# Patient Record
Sex: MALE | HISPANIC OR LATINO | Employment: FULL TIME | URBAN - METROPOLITAN AREA
[De-identification: names, ages, dates, MRNs, and addresses within clinical notes are randomized per-mention and may not be internally consistent; named-entity substitution may affect disease eponyms.]

---

## 2024-07-29 ENCOUNTER — APPOINTMENT (OUTPATIENT)
Dept: RADIOLOGY | Facility: IMAGING CENTER | Age: 24
End: 2024-07-29
Payer: OTHER MISCELLANEOUS

## 2024-07-29 ENCOUNTER — OCCUPATIONAL MEDICINE (OUTPATIENT)
Dept: URGENT CARE | Facility: CLINIC | Age: 24
End: 2024-07-29
Payer: OTHER MISCELLANEOUS

## 2024-07-29 VITALS
BODY MASS INDEX: 34.36 KG/M2 | TEMPERATURE: 97.6 F | DIASTOLIC BLOOD PRESSURE: 88 MMHG | WEIGHT: 232 LBS | RESPIRATION RATE: 18 BRPM | SYSTOLIC BLOOD PRESSURE: 124 MMHG | HEIGHT: 69 IN | OXYGEN SATURATION: 96 % | HEART RATE: 88 BPM

## 2024-07-29 DIAGNOSIS — M79.662 PAIN IN LEFT LOWER LEG: ICD-10-CM

## 2024-07-29 DIAGNOSIS — S80.12XA CONTUSION OF LEFT LOWER LEG, INITIAL ENCOUNTER: ICD-10-CM

## 2024-07-29 PROCEDURE — 73564 X-RAY EXAM KNEE 4 OR MORE: CPT | Mod: TC,FY,LT

## 2024-08-01 ENCOUNTER — OCCUPATIONAL MEDICINE (OUTPATIENT)
Dept: URGENT CARE | Facility: CLINIC | Age: 24
End: 2024-08-01
Payer: OTHER MISCELLANEOUS

## 2024-08-01 VITALS
RESPIRATION RATE: 18 BRPM | HEIGHT: 69 IN | DIASTOLIC BLOOD PRESSURE: 62 MMHG | HEART RATE: 60 BPM | WEIGHT: 232 LBS | TEMPERATURE: 98.3 F | BODY MASS INDEX: 34.36 KG/M2 | OXYGEN SATURATION: 97 % | SYSTOLIC BLOOD PRESSURE: 120 MMHG

## 2024-08-01 DIAGNOSIS — S80.12XD CONTUSION OF LEFT LOWER EXTREMITY, SUBSEQUENT ENCOUNTER: ICD-10-CM

## 2024-08-01 DIAGNOSIS — T14.8XXA HEMATOMA: ICD-10-CM

## 2024-08-01 PROCEDURE — 99213 OFFICE O/P EST LOW 20 MIN: CPT | Performed by: REGISTERED NURSE

## 2024-08-01 PROCEDURE — 3078F DIAST BP <80 MM HG: CPT | Performed by: REGISTERED NURSE

## 2024-08-01 PROCEDURE — 3074F SYST BP LT 130 MM HG: CPT | Performed by: REGISTERED NURSE

## 2024-08-01 RX ORDER — IBUPROFEN 200 MG
200 TABLET ORAL EVERY 12 HOURS
COMMUNITY

## 2024-08-01 NOTE — PROGRESS NOTES
"Subjective:     Franco Rey is a 24 y.o. male who presents for Follow-Up (Doi: 7/24/24, Lt lower leg, Lt shin was swollen yesterday after working 10 Hrs. )    Visit #2  Percent improved: 10%  Treatment: Using crutch and knee brace, tylenol, motrin  Restrictions: per last d39      PMH:   No pertinent past medical history to this problem  MEDS:  Medications were reviewed in EMR  ALLERGIES:  Allergies were reviewed in EMR  [employer]:  Works as a   FH:   No pertinent family history to this problem       Objective:     /62 (BP Location: Left arm, Patient Position: Sitting, BP Cuff Size: Adult)   Pulse 60   Temp 36.8 °C (98.3 °F) (Temporal)   Resp 18   Ht 1.753 m (5' 9\")   Wt 105 kg (232 lb)   SpO2 97%   BMI 34.26 kg/m²     TTP left lateral knee. Some slight fluctuance. NVID distal.    Assessment/Plan:       1. Contusion of left lower extremity, subsequent encounter    2. Hematoma    Other orders  - ibuprofen (MOTRIN) 200 MG Tab; Take 200 mg by mouth every 12 hours.     Restrictions till 8/8/2024  No lifting, pushing, pulling, carrying more than 10lbs. No standing > 4 hours per shift.   Marginal improvement  Recommend using compression stocking instead of knee brace.    Differential diagnosis, natural history, supportive care, and indications for immediate follow-up discussed.    "

## 2024-08-01 NOTE — LETTER
PHYSICIAN’S AND CHIROPRACTIC PHYSICIAN'S   PROGRESS REPORT CERTIFICATION OF DISABILITY Claim Number:     Social Security Number:    Patient’s Name:Franco Rey Date of Injury: 7/24/2024   Employer:  [employer] Name of O (if applicable)      Patient’s Job Description/Occupation:        Previous Injuries/Diseases/Surgeries Contributing to the Condition:  no        Diagnosis:    1. Contusion of left lower extremity, subsequent encounter        2. Hematoma                        Related to the Industrial Injury? Yes     Explain:         Objective Medical Findings:TTP left lateral knee. Small fluctuance over sight of contusion, likely hematoma. No bruising. No crepitus in left knee. NVID LLE        None - Discharged                         Stable  Yes                 Ratable  Yes     X  Generally Improved                        Condition Worsened                 Condition Same  May Have Suffered a Permanent Disability  No     Treatment Plan:    Continue with the tylenol and ibuprofen. Ice. And keep leg elevated as much as possible. Wean off of the crutch and the knee brace        No Change in Therapy                 PT/OT Prescribed                     Medication May be Used While Working       Case Management                        PT/OT Discontinued    Consultation    Further Diagnostic Studies:                               Prescription(s)                             Released to FULL DUTY /No Restrictions on (Date):  From:      Certified TOTALLY TEMPORARILY DISABLED (Indicate Dates) From:   To:    X  Released to RES TRICTED/Modified Duty on (Date): From:   To:                                                                 Restrictions Are:  Temporary     No Sitting                               No Standing                   No Pulling                  Other: No lifting, pushing, pulling, carrying more than 10lbs. No standing > 4 hours per shift.    No Bending at Waist           No  Stooping                    No Lifting       No Carrying                           No Walking                Lifting Restricted to (lbs.):       No Pushing                            No Climbing                   No Reaching Above Shoulders   Date of Next Visit:  8/8/2024 Date of this Exam:8/1/2024 Physician/Chiropractic Physician Name:KATHY Castaneda Physician/Chiropractic Physician Signature:  Regis Salas DO MPH   D-39 (Rev. 2/24)

## 2024-08-08 ENCOUNTER — OCCUPATIONAL MEDICINE (OUTPATIENT)
Dept: URGENT CARE | Facility: CLINIC | Age: 24
End: 2024-08-08
Payer: OTHER MISCELLANEOUS

## 2024-08-08 VITALS
BODY MASS INDEX: 34.36 KG/M2 | SYSTOLIC BLOOD PRESSURE: 122 MMHG | DIASTOLIC BLOOD PRESSURE: 70 MMHG | TEMPERATURE: 97.2 F | WEIGHT: 232 LBS | HEIGHT: 69 IN | HEART RATE: 54 BPM | RESPIRATION RATE: 18 BRPM | OXYGEN SATURATION: 96 %

## 2024-08-08 DIAGNOSIS — S80.12XD CONTUSION OF LEFT LOWER EXTREMITY, SUBSEQUENT ENCOUNTER: ICD-10-CM

## 2024-08-08 PROCEDURE — 3078F DIAST BP <80 MM HG: CPT

## 2024-08-08 PROCEDURE — 99213 OFFICE O/P EST LOW 20 MIN: CPT

## 2024-08-08 PROCEDURE — 3074F SYST BP LT 130 MM HG: CPT

## 2024-08-08 NOTE — LETTER
PHYSICIAN’S AND CHIROPRACTIC PHYSICIAN'S   PROGRESS REPORT CERTIFICATION OF DISABILITY Claim Number:     Social Security Number:    Patient’s Name:Franco Rey Date of Injury:7/24/2024   Employer:  [employer] Name of O (if applicable)      Patient’s Job Description/Occupation:        Previous Injuries/Diseases/Surgeries Contributing to the Condition:  N/A      Diagnosis:  (S80.12XD) Contusion of left lower extremity, subsequent encounter      Related to the Industrial Injury? Yes     Explain:Fell while working on ladder      Objective Medical Findings:Mild swelling. No erythema. Mild discomfort with ROM.        None - Discharged                         Stable  Yes                 Ratable  Yes     X  Generally Improved                        Condition Worsened                 Condition Same  May Have Suffered a Permanent Disability  No     Treatment Plan:             No Change in Therapy                 PT/OT Prescribed                     Medication May be Used While Working       Case Management                        PT/OT Discontinued    Consultation    Further Diagnostic Studies:                               Prescription(s)                             Released to FULL DUTY /No Restrictions on (Date):  From:      Certified TOTALLY TEMPORARILY DISABLED (Indicate Dates) From:   To:    X  Released to RESTRICTED/Modified Duty on (Date): From: 8/8/2024 To: 8/14/2024                                                               Restrictions Are:  Temporary     No Sitting                               No Standing                 X  No Pulling                  Other: No lifting, pushing, pulling, carrying more than 10lbs for greater than 4 hours per shift. No standing > 4 hours per shift.    No Bending at Waist           No Stooping                  X  No Lifting     X  No Carrying                           No Walking                Lifting Restricted to (lbs.):  < or = to 10 pounds  X   No Pushing                            No Climbing                   No Reaching Above Shoulders   Date of Next Visit:  8/14/2024 Date of this Exam:8/8/2024 Physician/Chiropractic Physician Name:KATHY Fatima Physician/Chiropractic Physician Signature:  Regis Salas DO MPH   D-39 (Rev. 2/24)

## 2024-08-08 NOTE — PROGRESS NOTES
"  CHIEF COMPLAINT  Chief Complaint   Patient presents with    Follow-Up     DOI: 7/24/24, Lt lower leg, feeling better today     Subjective:   Franco Rey is a 24 y.o. male who presents for Follow-Up (DOI: 7/24/24, Lt lower leg, feeling better today)  Patient reports occasionally using Tylenol and Motrin as needed.  No longer requiring brace or sleeve. Mild pain with ROM.       PAST MEDICAL HISTORY  There are no problems to display for this patient.      SURGICAL HISTORY  patient denies any surgical history    ALLERGIES  No Known Allergies    CURRENT MEDICATIONS  Home Medications       Reviewed by Omid Fabian't (Medical Assistant) on 08/08/24 at 1243  Med List Status: <None>     Medication Last Dose Status   ibuprofen (MOTRIN) 200 MG Tab PRN Active                    SOCIAL HISTORY  Social History     Tobacco Use    Smoking status: Some Days     Types: Cigars    Smokeless tobacco: Never   Vaping Use    Vaping status: Never Used   Substance and Sexual Activity    Alcohol use: Never    Drug use: Never    Sexual activity: Not on file       FAMILY HISTORY  History reviewed. No pertinent family history.      Medications, Allergies, and current problem list reviewed today in Epic.     Objective:     /70 (BP Location: Left arm, Patient Position: Sitting, BP Cuff Size: Adult)   Pulse (!) 54   Temp 36.2 °C (97.2 °F) (Temporal)   Resp 18   Ht 1.753 m (5' 9\")   Wt 105 kg (232 lb)   SpO2 96%     Physical Exam  Vitals reviewed.   Constitutional:       General: He is not in acute distress.     Appearance: Normal appearance. He is normal weight. He is not toxic-appearing.   HENT:      Head: Normocephalic.   Musculoskeletal:      Left knee: Swelling present. Normal range of motion. No tenderness.   Skin:     General: Skin is warm.      Comments: Mild swelling. No erythema. Mild discomfort with ROM.   Neurological:      Mental Status: He is alert.         Assessment/Plan:     Diagnosis and associated " orders:     1. Contusion of left lower extremity, subsequent encounter           Comments/MDM:     Continue with Tylenol/Motrin as needed.  No lifting, pushing, pulling, carrying anything greater than 10 pounds for longer than 4 hours per shift.  No standing greater than 4 hours per shift.  Follow-up in 1 week.  Expect MMI           Differential diagnosis, natural history, supportive care, and indications for immediate follow-up discussed.    Advised the patient to follow-up with the primary care physician for recheck, reevaluation, and consideration of further management.    Please note that this dictation was created using voice recognition software. I have made a reasonable attempt to correct obvious errors, but I expect that there are errors of grammar and possibly content that I did not discover before finalizing the note.    This note was electronically signed by KATHY Fatima

## 2024-08-14 ENCOUNTER — OCCUPATIONAL MEDICINE (OUTPATIENT)
Dept: URGENT CARE | Facility: CLINIC | Age: 24
End: 2024-08-14
Payer: OTHER MISCELLANEOUS

## 2024-08-14 VITALS
BODY MASS INDEX: 34.36 KG/M2 | OXYGEN SATURATION: 96 % | HEIGHT: 69 IN | RESPIRATION RATE: 16 BRPM | SYSTOLIC BLOOD PRESSURE: 108 MMHG | WEIGHT: 232 LBS | TEMPERATURE: 97.7 F | DIASTOLIC BLOOD PRESSURE: 60 MMHG | HEART RATE: 68 BPM

## 2024-08-14 DIAGNOSIS — S80.12XD CONTUSION OF LEFT LOWER EXTREMITY, SUBSEQUENT ENCOUNTER: ICD-10-CM

## 2024-08-14 PROCEDURE — 3078F DIAST BP <80 MM HG: CPT | Performed by: REGISTERED NURSE

## 2024-08-14 PROCEDURE — 99213 OFFICE O/P EST LOW 20 MIN: CPT | Performed by: REGISTERED NURSE

## 2024-08-14 PROCEDURE — 3074F SYST BP LT 130 MM HG: CPT | Performed by: REGISTERED NURSE

## 2024-08-14 NOTE — PROGRESS NOTES
"Subjective:     Franco Rey is a 24 y.o. male who presents for Follow-Up (DOI: 7/24/24, Lt leg, feeling better today)      Visit #4  History of Present Illness  The patient is a 24-year-old male who presents for a follow-up visit.     He reports feeling fully recovered and expresses a desire to resume his regular activities. He denies any tenderness.          PMH:   No pertinent past medical history to this problem  MEDS:  Medications were reviewed in EMR  ALLERGIES:  Allergies were reviewed in EMR  FH:   No pertinent family history to this problem       Objective:     /60 (BP Location: Left arm, Patient Position: Sitting, BP Cuff Size: Adult)   Pulse 68   Temp 36.5 °C (97.7 °F) (Temporal)   Resp 16   Ht 1.753 m (5' 9\")   Wt 105 kg (232 lb)   SpO2 96%   BMI 34.26 kg/m²     See scanned paperwork    Assessment/Plan:       1. Contusion of left lower extremity, subsequent encounter     See scanned paperwork          Differential diagnosis, natural history, supportive care, and indications for immediate follow-up discussed.  "

## 2024-08-14 NOTE — LETTER
PHYSICIAN[facility]S AND CHIROPRACTIC PHYSICIAN'S   PROGRESS REPORT CERTIFICATION OF DISABILITY Claim Number:     Social Security Number:    Patient’s Name:Franco Rey Date of Injury:7/24/2024   Employer:  [employer] 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 Yes     Explain:       Objective Medical Findings:No left knee TTP, normal active/passive ROM. NVID      X  None - Discharged                         Stable  Yes                 Ratable  Yes     X  Generally Improved                        Condition Worsened                 Condition Same  May Have Suffered a Permanent Disability  No     Treatment Plan:    Reports 100% improvement.         No Change in Therapy                 PT/OT Prescribed                     Medication May be Used While Working       Case Management                        PT/OT Discontinued    Consultation    Further Diagnostic Studies:                               Prescription(s)                           X  Released to FULL DUTY /No Restrictions on (Date):  From:      Certified TOTALLY TEMPORARILY DISABLED (Indicate Dates) From:   To:      Released to RESTRICTED/Modified Duty on (Date): From:   To:                                                                 Restrictions Are:  Temporary     No Sitting                               No Standing                   No Pulling                  Other:      No Bending at Waist           No Stooping                    No Lifting       No Carrying                           No Walking                Lifting Restricted to (lbs.):       No Pushing                            No Climbing                   No Reaching Above Shoulders   Date of Next Visit:   COLLEEN MMI Date of this  Exam:8/14/2024 Physician/Chiropractic Physician Name:KATHY Castaneda Physician/Chiropractic Physician Signature:  Regis Salas DO MPH   D-39 (Rev. 2/24)